# Patient Record
Sex: FEMALE | Race: OTHER | NOT HISPANIC OR LATINO | URBAN - METROPOLITAN AREA
[De-identification: names, ages, dates, MRNs, and addresses within clinical notes are randomized per-mention and may not be internally consistent; named-entity substitution may affect disease eponyms.]

---

## 2024-09-28 ENCOUNTER — OUTPATIENT (OUTPATIENT)
Dept: OUTPATIENT SERVICES | Facility: HOSPITAL | Age: 24
LOS: 1 days | End: 2024-09-28
Payer: COMMERCIAL

## 2024-09-28 VITALS
RESPIRATION RATE: 16 BRPM | HEART RATE: 88 BPM | TEMPERATURE: 98 F | SYSTOLIC BLOOD PRESSURE: 101 MMHG | OXYGEN SATURATION: 99 % | DIASTOLIC BLOOD PRESSURE: 62 MMHG

## 2024-09-28 DIAGNOSIS — O26.899 OTHER SPECIFIED PREGNANCY RELATED CONDITIONS, UNSPECIFIED TRIMESTER: ICD-10-CM

## 2024-09-28 LAB — AMNISURE ROM (RUPTURE OF MEMBRANES): NEGATIVE — SIGNIFICANT CHANGE UP

## 2024-09-28 PROCEDURE — 99212 OFFICE O/P EST SF 10 MIN: CPT | Mod: 25

## 2024-09-28 PROCEDURE — 59025 FETAL NON-STRESS TEST: CPT | Mod: 26

## 2024-09-28 PROCEDURE — 59025 FETAL NON-STRESS TEST: CPT

## 2024-09-28 PROCEDURE — 84112 EVAL AMNIOTIC FLUID PROTEIN: CPT

## 2024-09-28 PROCEDURE — G0463: CPT

## 2024-09-28 RX ORDER — TERCONAZOLE 80 MG/1
1 SUPPOSITORY VAGINAL
Qty: 1 | Refills: 0
Start: 2024-09-28 | End: 2024-09-30

## 2024-09-28 NOTE — OB PROVIDER TRIAGE NOTE - HISTORY OF PRESENT ILLNESS
How Severe Is Your Skin Lesion?: mild Has Your Skin Lesion Been Treated?: not been treated Is This A New Presentation, Or A Follow-Up?: Growth  25 YO  at 36w5d LMP: 1/15/24 MACKENZIE: 10/21/24 presents with complaints of LOF fluid at 6am. Pt states that  fluid was white at that time. Denies any  +FM. Denies HA, vision changes, Chest pain, SOB, epigastric pain, edema, N/V/D, or RUQ pain.    Prenatal care: Sajajd  Complicated by: denies     History  OBHx: Denies   GYNHx: Denies fibroids, cysts, STDs, or abnormal pap smears  PMH: Denies  Meds: PNV, Vit D,A,K   PSHx: Denies  Allergies: NKA  Social: Denies tobacco, EtOH, and drug use  Psych: Denies anxiety, depression, PTSD, or previous suicide attempts. Pt feels safe at home.

## 2024-09-28 NOTE — OB RN TRIAGE NOTE - INTERNATIONAL TRAVEL
Attempted to contact patient for care coordination follow up regarding COPD  Unable to reach patient by phone. Message left regarding purpose of call. Number provided and call back requested. Rita Butler No

## 2024-09-28 NOTE — OB PROVIDER TRIAGE NOTE - ADDITIONAL INSTRUCTIONS
- Follow up with Primary OBGYN as scheduled   - Start terconazole cream for any vaginal itching/ discomfort  - Continue prenatal vitamins   - Check the baby movements with fetal kick counts, return to the hospital if you have decreased fetal movement   - Return to hospital with signs and symptoms of labor: Regular strong contractions, vaginal bleeding, or if your water breaks

## 2024-09-28 NOTE — OB RN TRIAGE NOTE - NS_LMP_OBGYN_ALL_OB_DT
SUBJECTIVE:  Rodrigo Boone is an 43 year old  Presents for:  Chief Complaint   Patient presents with    Office Visit    Pre-Op Exam     Pt is scheduled for hysteroscopy with novasure ablation     History of Chief Complaint: Rodrigo is her for a pre op visit.  She is scheduled for a hysteroscopy with Novasure endometrial ablation for menometrorrhagia on 24 at 09:15.  Her pre op US showed:    FINDINGS: Transabdominal and endovaginal imaging of the pelvis is  performed. The uterus is anteverted and midline in position. Redemonstrated  is a subserosal anterior uterine body fibroid measuring 1.5 x 1.5 x 1.8 cm,  slightly decreased in size since prior study.  There is a diffusely thickened endometrium which appears heterogeneous,  containing multiple small cystic foci.     Right ovary 4.5 x 2.9 x 3.7 cm. Dominant 3.9 x 2.6 x 3.1 cm cyst. Normal  color flow and spectral duplex imaging with waveforms. No torsion.     Left ovary 2.6 x 1.4 x 3.0 cm. Dominant 1.3 cm follicle as well as a 1.8 cm  paraovarian cyst. Normal color flow and spectral duplex imaging with  waveforms. No torsion.     No free pelvic fluid.     UTERUS:  Length 9.6 cm  Height   5.3 cm  Width    6.0 cm  Endometrium      2.0 cm     IMPRESSION:  1. Stable to decreased size subserosal anterior uterine body fibroid.  2. Heterogeneous thickened endometrium. Please correlate with stage of  menstrual cycle. Direct visualization and tissue sampling may be warranted.  3. Dominant right ovarian cyst as seen previously.              Electronically Signed by: PRESTON LIVINGSTON M.D.   Signed on: 3/21/2024 4:44 PM     She had a hysteroscopy with a D&C and the pathology showed:    Pathologic Diagnosis      Endometrium, curettings:  - Fragments of benign endometrial polyp.  - Fragments of secretory endometrium day 26 - .   Electronically signed by Syed Almonte MD on 2024 at 2230     Her tubes have already been tied.    Past Medical History:   Diagnosis  Date    Anemia     Menometrorrhagia        Past Surgical History:   Procedure Laterality Date    D and c  04/23/2024    Tubal ligation         Current Outpatient Medications   Medication Sig Dispense Refill    medroxyPROGESTERone (PROVERA) 10 MG tablet Take 1 tablet by mouth daily. 30 tablet 0    ferrous sulfate 300 (60 Fe) MG/5ML liquid TAKE 5 MLS BY MOUTH IN THE MORNING AND 5 MLS AT NOON AND 5 MLS IN THE EVENING. *FILL WHAT YOU HAVE! 250 mL 1     No current facility-administered medications for this visit.     ALLERGIES:  No Known Allergies    Social History     Tobacco Use    Smoking status: Never    Smokeless tobacco: Never   Substance Use Topics    Alcohol use: Not Currently       Review Of Systems  Review of Systems   Constitutional: Negative.    HENT: Negative.     Eyes: Negative.    Respiratory: Negative.     Cardiovascular: Negative.    Gastrointestinal: Negative.    Endocrine: Negative.    Genitourinary:         As above.   Musculoskeletal: Negative.    Skin: Negative.    Allergic/Immunologic: Negative.    Neurological: Negative.    Hematological: Negative.    Psychiatric/Behavioral: Negative.         OBJECTIVE:  Visit Vitals  /77   Pulse 62   Temp 97.8 °F (36.6 °C) (Temporal)   Resp 18   Ht 5' 4\" (1.626 m)   Wt 87.8 kg (193 lb 7.3 oz)   LMP 06/19/2024 (Exact Date) Comment: has been having irregular bleeding since November 2023   BMI 33.21 kg/m²     Physical Exam  Constitutional:       Appearance: Normal appearance.   Genitourinary:      Vulva, bladder and urethral meatus normal.      Vulva exam comments: Normal..      Vaginal exam comments: Normal..        Right Adnexa: not tender and no mass present.     Left Adnexa: not tender and no mass present.     Cervical exam comments: Normal..      Uterus exam comments: Normal..   Cardiovascular:      Rate and Rhythm: Normal rate and regular rhythm.      Pulses: Normal pulses.      Heart sounds: Normal heart sounds.   Pulmonary:      Effort: Pulmonary  effort is normal.      Breath sounds: Normal breath sounds.   Abdominal:      General: Abdomen is flat. Bowel sounds are normal. There is no distension.      Palpations: Abdomen is soft. There is no mass.      Tenderness: There is no abdominal tenderness.      Hernia: No hernia is present.   Musculoskeletal:         General: Normal range of motion.      Cervical back: Normal range of motion and neck supple.   Neurological:      General: No focal deficit present.      Mental Status: She is alert and oriented to person, place, and time.   Skin:     General: Skin is warm and dry.   Psychiatric:         Mood and Affect: Mood normal.         Behavior: Behavior normal.         Thought Content: Thought content normal.         Judgment: Judgment normal.   Vitals reviewed.         ASSESSMENT/PLAN:  Diagnoses and all orders for this visit:  Pre-op examination  -     CBC with Automated Differential  Menometrorrhagia  -     CBC with Automated Differential  Pre-procedural laboratory examination  -     POCT Urine pregnancy, in-office  -     POCT Urine Dip Auto  -     CBC with Automated Differential    The procedure was explained.  She understands success is 80 to 90% and it might be slightly lower for her due to the fact that she has a fibroid.  Success includes no periods, scant periods or less bleeding then before the procedure.  Risks and complications were reviewed.  Pre op labs were obtained.  Pre op instructions were discussed.  She will follow up in the week after for a post op visit.     15-Emile-2024

## 2024-09-28 NOTE — OB PROVIDER TRIAGE NOTE - NSOBPROVIDERNOTE_OBGYN_ALL_OB_FT
25 yo @ 36w5d presents for r/o rupture, pt stable     - FHT reviewed  - amnisure sent, negative   - Vaginal discharged discussed with patient, Terconazole sent to pharm   - Labor precaution and kick counts discussed   - pt advised to follow up with OB as scheduled     D/W Dr Santiago

## 2024-10-01 DIAGNOSIS — Z3A.36 36 WEEKS GESTATION OF PREGNANCY: ICD-10-CM

## 2024-10-01 DIAGNOSIS — O99.891 OTHER SPECIFIED DISEASES AND CONDITIONS COMPLICATING PREGNANCY: ICD-10-CM

## 2024-10-01 DIAGNOSIS — N89.8 OTHER SPECIFIED NONINFLAMMATORY DISORDERS OF VAGINA: ICD-10-CM

## 2024-10-01 DIAGNOSIS — Z03.71 ENCOUNTER FOR SUSPECTED PROBLEM WITH AMNIOTIC CAVITY AND MEMBRANE RULED OUT: ICD-10-CM

## 2024-10-20 ENCOUNTER — INPATIENT (INPATIENT)
Facility: HOSPITAL | Age: 24
LOS: 2 days | Discharge: HOME | End: 2024-10-23
Attending: OBSTETRICS & GYNECOLOGY | Admitting: OBSTETRICS & GYNECOLOGY
Payer: MEDICAID

## 2024-10-20 VITALS
RESPIRATION RATE: 16 BRPM | DIASTOLIC BLOOD PRESSURE: 76 MMHG | TEMPERATURE: 98 F | SYSTOLIC BLOOD PRESSURE: 112 MMHG | HEART RATE: 63 BPM

## 2024-10-20 DIAGNOSIS — O26.899 OTHER SPECIFIED PREGNANCY RELATED CONDITIONS, UNSPECIFIED TRIMESTER: ICD-10-CM

## 2024-10-20 DIAGNOSIS — Z34.80 ENCOUNTER FOR SUPERVISION OF OTHER NORMAL PREGNANCY, UNSPECIFIED TRIMESTER: ICD-10-CM

## 2024-10-20 LAB
APTT BLD: 28.5 SEC — SIGNIFICANT CHANGE UP (ref 24.5–35.6)
BASOPHILS # BLD AUTO: 0.03 K/UL — SIGNIFICANT CHANGE UP (ref 0–0.2)
BASOPHILS NFR BLD AUTO: 0.3 % — SIGNIFICANT CHANGE UP (ref 0–2)
EOSINOPHIL # BLD AUTO: 0.14 K/UL — SIGNIFICANT CHANGE UP (ref 0–0.5)
EOSINOPHIL NFR BLD AUTO: 1.2 % — SIGNIFICANT CHANGE UP (ref 0–6)
FIBRINOGEN PPP-MCNC: 570 MG/DL — HIGH (ref 200–475)
HCT VFR BLD CALC: 37.2 % — SIGNIFICANT CHANGE UP (ref 34.5–45)
HGB BLD-MCNC: 12.9 G/DL — SIGNIFICANT CHANGE UP (ref 11.5–15.5)
IMM GRANULOCYTES NFR BLD AUTO: 0.4 % — SIGNIFICANT CHANGE UP (ref 0–0.9)
INR BLD: 0.87 RATIO — SIGNIFICANT CHANGE UP (ref 0.85–1.16)
LYMPHOCYTES # BLD AUTO: 2.51 K/UL — SIGNIFICANT CHANGE UP (ref 1–3.3)
LYMPHOCYTES # BLD AUTO: 22.2 % — SIGNIFICANT CHANGE UP (ref 13–44)
MCHC RBC-ENTMCNC: 32.3 PG — SIGNIFICANT CHANGE UP (ref 27–34)
MCHC RBC-ENTMCNC: 34.7 GM/DL — SIGNIFICANT CHANGE UP (ref 32–36)
MCV RBC AUTO: 93 FL — SIGNIFICANT CHANGE UP (ref 80–100)
MONOCYTES # BLD AUTO: 0.79 K/UL — SIGNIFICANT CHANGE UP (ref 0–0.9)
MONOCYTES NFR BLD AUTO: 7 % — SIGNIFICANT CHANGE UP (ref 2–14)
NEUTROPHILS # BLD AUTO: 7.81 K/UL — HIGH (ref 1.8–7.4)
NEUTROPHILS NFR BLD AUTO: 68.9 % — SIGNIFICANT CHANGE UP (ref 43–77)
NRBC # BLD: 0 /100 WBCS — SIGNIFICANT CHANGE UP (ref 0–0)
PLATELET # BLD AUTO: 218 K/UL — SIGNIFICANT CHANGE UP (ref 150–400)
PROTHROM AB SERPL-ACNC: 10.2 SEC — SIGNIFICANT CHANGE UP (ref 9.9–13.4)
RBC # BLD: 4 M/UL — SIGNIFICANT CHANGE UP (ref 3.8–5.2)
RBC # FLD: 13.1 % — SIGNIFICANT CHANGE UP (ref 10.3–14.5)
WBC # BLD: 11.33 K/UL — HIGH (ref 3.8–10.5)
WBC # FLD AUTO: 11.33 K/UL — HIGH (ref 3.8–10.5)

## 2024-10-20 PROCEDURE — 88307 TISSUE EXAM BY PATHOLOGIST: CPT | Mod: 26

## 2024-10-20 DEVICE — SURGICEL FIBRILLAR 2 X 4": Type: IMPLANTABLE DEVICE | Status: FUNCTIONAL

## 2024-10-20 RX ORDER — ACETAMINOPHEN 500 MG
975 TABLET ORAL
Refills: 0 | Status: DISCONTINUED | OUTPATIENT
Start: 2024-10-20 | End: 2024-10-23

## 2024-10-20 RX ORDER — MAGNESIUM HYDROXIDE 1200 MG/15ML
30 SUSPENSION ORAL
Refills: 0 | Status: DISCONTINUED | OUTPATIENT
Start: 2024-10-20 | End: 2024-10-23

## 2024-10-20 RX ORDER — AZITHROMYCIN DIHYDRATE 200 MG/5ML
500 POWDER, FOR SUSPENSION ORAL ONCE
Refills: 0 | Status: COMPLETED | OUTPATIENT
Start: 2024-10-20 | End: 2024-10-20

## 2024-10-20 RX ORDER — OXYTOCIN IN D5W-0.2% SODIUM CL 15/250 ML
10 PLASTIC BAG, INJECTION (ML) INTRAVENOUS ONCE
Refills: 0 | Status: COMPLETED | OUTPATIENT
Start: 2024-10-20 | End: 2024-10-20

## 2024-10-20 RX ORDER — FAMOTIDINE 10 MG/ML
20 INJECTION INTRAVENOUS ONCE
Refills: 0 | Status: DISCONTINUED | OUTPATIENT
Start: 2024-10-20 | End: 2024-10-20

## 2024-10-20 RX ORDER — IBUPROFEN 200 MG
600 TABLET ORAL EVERY 6 HOURS
Refills: 0 | Status: COMPLETED | OUTPATIENT
Start: 2024-10-20 | End: 2025-09-18

## 2024-10-20 RX ORDER — AZITHROMYCIN DIHYDRATE 200 MG/5ML
500 POWDER, FOR SUSPENSION ORAL EVERY 24 HOURS
Refills: 0 | Status: DISCONTINUED | OUTPATIENT
Start: 2024-10-20 | End: 2024-10-20

## 2024-10-20 RX ORDER — OXYCODONE HYDROCHLORIDE 30 MG/1
5 TABLET ORAL
Refills: 0 | Status: COMPLETED | OUTPATIENT
Start: 2024-10-20 | End: 2024-10-27

## 2024-10-20 RX ORDER — MODIFIED LANOLIN
1 OINTMENT (GRAM) TOPICAL EVERY 6 HOURS
Refills: 0 | Status: DISCONTINUED | OUTPATIENT
Start: 2024-10-20 | End: 2024-10-23

## 2024-10-20 RX ORDER — OXYTOCIN IN D5W-0.2% SODIUM CL 15/250 ML
42 PLASTIC BAG, INJECTION (ML) INTRAVENOUS
Qty: 30 | Refills: 0 | Status: DISCONTINUED | OUTPATIENT
Start: 2024-10-20 | End: 2024-10-23

## 2024-10-20 RX ORDER — DEXAMETHASONE 1.5 MG 1.5 MG/1
4 TABLET ORAL EVERY 6 HOURS
Refills: 0 | Status: DISCONTINUED | OUTPATIENT
Start: 2024-10-20 | End: 2024-10-23

## 2024-10-20 RX ORDER — OXYTOCIN IN D5W-0.2% SODIUM CL 15/250 ML
167 PLASTIC BAG, INJECTION (ML) INTRAVENOUS
Qty: 30 | Refills: 0 | Status: DISCONTINUED | OUTPATIENT
Start: 2024-10-20 | End: 2024-10-20

## 2024-10-20 RX ORDER — OXYTOCIN IN D5W-0.2% SODIUM CL 15/250 ML
167 PLASTIC BAG, INJECTION (ML) INTRAVENOUS
Qty: 30 | Refills: 0 | Status: COMPLETED | OUTPATIENT
Start: 2024-10-20

## 2024-10-20 RX ORDER — CITRIC ACID/SODIUM CITRATE 334-500MG
15 SOLUTION, ORAL ORAL EVERY 6 HOURS
Refills: 0 | Status: DISCONTINUED | OUTPATIENT
Start: 2024-10-20 | End: 2024-10-20

## 2024-10-20 RX ORDER — CEFAZOLIN SODIUM 1 G
2000 VIAL (EA) INJECTION ONCE
Refills: 0 | Status: DISCONTINUED | OUTPATIENT
Start: 2024-10-20 | End: 2024-10-20

## 2024-10-20 RX ORDER — HEPARIN SODIUM 10000 [USP'U]/ML
5000 INJECTION INTRAVENOUS; SUBCUTANEOUS EVERY 12 HOURS
Refills: 0 | Status: DISCONTINUED | OUTPATIENT
Start: 2024-10-21 | End: 2024-10-23

## 2024-10-20 RX ORDER — CITRIC ACID/SODIUM CITRATE 334-500MG
30 SOLUTION, ORAL ORAL ONCE
Refills: 0 | Status: DISCONTINUED | OUTPATIENT
Start: 2024-10-20 | End: 2024-10-20

## 2024-10-20 RX ORDER — NALOXONE HYDROCHLORIDE 1 MG/ML
0.1 INJECTION, SOLUTION INTRAMUSCULAR; INTRAVENOUS; SUBCUTANEOUS
Refills: 0 | Status: DISCONTINUED | OUTPATIENT
Start: 2024-10-20 | End: 2024-10-23

## 2024-10-20 RX ORDER — DIPHENHYDRAMINE HCL 12.5MG/5ML
25 ELIXIR ORAL EVERY 6 HOURS
Refills: 0 | Status: DISCONTINUED | OUTPATIENT
Start: 2024-10-20 | End: 2024-10-23

## 2024-10-20 RX ORDER — CLOSTRIDIUM TETANI TOXOID ANTIGEN (FORMALDEHYDE INACTIVATED), CORYNEBACTERIUM DIPHTHERIAE TOXOID ANTIGEN (FORMALDEHYDE INACTIVATED), BORDETELLA PERTUSSIS TOXOID ANTIGEN (GLUTARALDEHYDE INACTIVATED), BORDETELLA PERTUSSIS FILAMENTOUS HEMAGGLUTININ ANTIGEN (FORMALDEHYDE INACTIVATED), BORDETELLA PERTUSSIS PERTACTIN ANTIGEN, AND BORDETELLA PERTUSSIS FIMBRIAE 2/3 ANTIGEN 5; 2; 2.5; 5; 3; 5 [LF]/.5ML; [LF]/.5ML; UG/.5ML; UG/.5ML; UG/.5ML; UG/.5ML
0.5 INJECTION, SUSPENSION INTRAMUSCULAR ONCE
Refills: 0 | Status: DISCONTINUED | OUTPATIENT
Start: 2024-10-20 | End: 2024-10-23

## 2024-10-20 RX ORDER — CHLORHEXIDINE GLUCONATE 40 MG/ML
1 SOLUTION TOPICAL DAILY
Refills: 0 | Status: DISCONTINUED | OUTPATIENT
Start: 2024-10-20 | End: 2024-10-20

## 2024-10-20 RX ORDER — KETOROLAC TROMETHAMINE 30 MG/ML
30 INJECTION INTRAMUSCULAR; INTRAVENOUS EVERY 6 HOURS
Refills: 0 | Status: DISCONTINUED | OUTPATIENT
Start: 2024-10-20 | End: 2024-10-21

## 2024-10-20 RX ORDER — OXYCODONE HYDROCHLORIDE 30 MG/1
5 TABLET ORAL ONCE
Refills: 0 | Status: DISCONTINUED | OUTPATIENT
Start: 2024-10-20 | End: 2024-10-23

## 2024-10-20 RX ORDER — OXYTOCIN IN D5W-0.2% SODIUM CL 15/250 ML
2 PLASTIC BAG, INJECTION (ML) INTRAVENOUS
Qty: 30 | Refills: 0 | Status: DISCONTINUED | OUTPATIENT
Start: 2024-10-20 | End: 2024-10-20

## 2024-10-20 RX ORDER — ONDANSETRON HYDROCHLORIDE 2 MG/ML
4 INJECTION, SOLUTION INTRAMUSCULAR; INTRAVENOUS EVERY 6 HOURS
Refills: 0 | Status: DISCONTINUED | OUTPATIENT
Start: 2024-10-20 | End: 2024-10-23

## 2024-10-20 RX ORDER — CEFAZOLIN SODIUM 1 G
2000 VIAL (EA) INJECTION ONCE
Refills: 0 | Status: COMPLETED | OUTPATIENT
Start: 2024-10-20 | End: 2024-10-20

## 2024-10-20 RX ORDER — AZITHROMYCIN DIHYDRATE 200 MG/5ML
500 POWDER, FOR SUSPENSION ORAL ONCE
Refills: 0 | Status: DISCONTINUED | OUTPATIENT
Start: 2024-10-20 | End: 2024-10-20

## 2024-10-20 RX ORDER — SIMETHICONE 80 MG/1
80 TABLET, CHEWABLE ORAL EVERY 4 HOURS
Refills: 0 | Status: DISCONTINUED | OUTPATIENT
Start: 2024-10-20 | End: 2024-10-23

## 2024-10-20 RX ORDER — MORPHINE SULFATE 30 MG/1
3 TABLET, EXTENDED RELEASE ORAL ONCE
Refills: 0 | Status: DISCONTINUED | OUTPATIENT
Start: 2024-10-20 | End: 2024-10-23

## 2024-10-20 RX ADMIN — Medication 1000 MILLILITER(S): at 20:32

## 2024-10-20 RX ADMIN — Medication 125 MILLILITER(S): at 14:36

## 2024-10-20 RX ADMIN — Medication 2 MILLIUNIT(S)/MIN: at 18:08

## 2024-10-20 RX ADMIN — CHLORHEXIDINE GLUCONATE 1 APPLICATION(S): 40 SOLUTION TOPICAL at 18:07

## 2024-10-20 RX ADMIN — Medication 42 MILLIUNIT(S)/MIN: at 19:29

## 2024-10-20 RX ADMIN — Medication 10 UNIT(S): at 19:29

## 2024-10-20 RX ADMIN — Medication 100 MILLIGRAM(S): at 19:26

## 2024-10-20 RX ADMIN — Medication 125 MILLILITER(S): at 18:04

## 2024-10-20 RX ADMIN — AZITHROMYCIN DIHYDRATE 255 MILLIGRAM(S): 200 POWDER, FOR SUSPENSION ORAL at 19:28

## 2024-10-20 RX ADMIN — Medication 10 UNIT(S): at 19:30

## 2024-10-20 NOTE — OB RN DELIVERY SUMMARY - NS_SEPSISRSKCALC_OBGYN_ALL_OB_FT
EOS calculated successfully. EOS Risk Factor: 0.5/1000 live births ([facility] national incidence); GA=39w6d; Temp=98.8; ROM=0.2; GBS='Negative'; Antibiotics='Broad spectrum antibiotics 2-3.9 hrs prior to birth'

## 2024-10-20 NOTE — OB RN PATIENT PROFILE - BREAST MILK SUPPORTS STABLE NEWBORN BLOOD SUGAR
Problem: Pain  Goal: #Acceptable pain/comfort level is achieved/maintained at rest (based on self report using Numeric Rating Scales/Faces  Outcome: Outcome Not Met, Continue to Monitor  Pt reported pain as a 9 on a scale of 0-10 at 0900 this morning. PRN pain medication given, and 1 hour later pt was sleeping comfortably in room. Will continue to monitor.     Problem: Pressure Injury, Risk for  Goal: No new pressure injury (PI) development  Outcome: Outcome Met, Continue evaluating goal progress toward completion  Pt displays no s/s of new pressure injury. Pt is independent in positioning and switching positions frequently. Will continue to monitor.     Problem: VTE, Risk for  Goal: # No s/s of VTE  Outcome: Outcome Met, Continue evaluating goal progress toward completion  Pt displays no s/s of VTE at this time. Will continue to monitor.     Problem: At Risk for Falls  Goal: # Patient does not fall  Outcome: Outcome Met, Continue evaluating goal progress toward completion  Pt has not fallen so far today. Call light in reach, and uses appropriately when needing assistance. Will continue to monitor.        Statement Selected

## 2024-10-20 NOTE — DISCHARGE NOTE OB - CLICK TO LAUNCH ORM
. Cheek Interpolation Flap Text: A decision was made to reconstruct the defect utilizing an interpolation axial flap and a staged reconstruction.  A telfa template was made of the defect.  This telfa template was then used to outline the Cheek Interpolation flap.  The donor area for the pedicle flap was then injected with anesthesia.  The flap was excised through the skin and subcutaneous tissue down to the layer of the underlying musculature.  The interpolation flap was carefully excised within this deep plane to maintain its blood supply.  The edges of the donor site were undermined.   The donor site was closed in a primary fashion.  The pedicle was then rotated into position and sutured.  Once the tube was sutured into place, adequate blood supply was confirmed with blanching and refill.  The pedicle was then wrapped with xeroform gauze and dressed appropriately with a telfa and gauze bandage to ensure continued blood supply and protect the attached pedicle.

## 2024-10-20 NOTE — OB PROVIDER H&P - NSHPPHYSICALEXAM_GEN_ALL_CORE
Vital Signs Last 24 Hrs  T(C): 37.1 (20 Oct 2024 12:02), Max: 37.1 (20 Oct 2024 12:02)  T(F): 98.7 (20 Oct 2024 12:02), Max: 98.7 (20 Oct 2024 12:02)  HR: 70 (20 Oct 2024 12:02) (70 - 70)  BP: 111/76 (20 Oct 2024 12:02) (111/76 - 111/76)  RR: 18 (20 Oct 2024 12:02) (18 - 18)  SpO2: 99% (20 Oct 2024 12:02) (99% - 99%)    Patient seen with EVANGELISTA Damon.     Physical Exam:   General: A & O, in NAD  Abdomen: gravid uterus, soft, nontender  SVE: 1-2/70/-3/intact    FHT: Baseline 145, moderate variability, + Acels, no decels   [place]: Contractions every 4-5 minutes    Bedside Sono: Cephalic Vital Signs Last 24 Hrs  T(C): 37.1 (20 Oct 2024 12:02), Max: 37.1 (20 Oct 2024 12:02)  T(F): 98.7 (20 Oct 2024 12:02), Max: 98.7 (20 Oct 2024 12:02)  HR: 70 (20 Oct 2024 12:02) (70 - 70)  BP: 111/76 (20 Oct 2024 12:02) (111/76 - 111/76)  RR: 18 (20 Oct 2024 12:02) (18 - 18)  SpO2: 99% (20 Oct 2024 12:02) (99% - 99%)    Patient seen with EVANGELISTA Damon.     Physical Exam:   General: A & O, in NAD  Abdomen: gravid uterus, soft, nontender  SVE: 1-2/70/-3/intact    FHT: 145, moderate variability, + Accels, no decels   [place]: Contractions every 4-5 minutes    Bedside Sono: Cephalic

## 2024-10-20 NOTE — DISCHARGE NOTE OB - PATIENT PORTAL LINK FT
You can access the FollowMyHealth Patient Portal offered by [facility] by registering at the following website: [email or web address]. By joining "[employer]’s FollowMyHealth portal, you will also be able to view your health information using other applications (apps) compatible with our system.

## 2024-10-20 NOTE — OB RN TRIAGE NOTE - FALL HARM RISK - UNIVERSAL INTERVENTIONS
Bed in lowest position, wheels locked, appropriate side rails in place/Call bell, personal items and telephone in reach/Instruct patient to call for assistance before getting out of bed or chair/Non-slip footwear when patient is out of bed/[place] to call system/Physically safe environment - no spills, clutter or unnecessary equipment/Purposeful Proactive Rounding/Room/bathroom lighting operational, light cord in reach

## 2024-10-20 NOTE — OB RN INTRAOPERATIVE NOTE - NSSELHIDDEN_OBGYN_ALL_OB_FT
[[email or web address]_ALL_OB_FT:[email or web address]],[NS_DeliveryAssist1_OBGYN_ALL_OB_FT:[id number]]

## 2024-10-20 NOTE — DISCHARGE NOTE OB - HOSPITAL COURSE
Pt admitted for early labor .   IN labor pt had a prolonged decel resulting in a stat  section  pt had routine post-op care.

## 2024-10-20 NOTE — DISCHARGE NOTE OB - MEDICATION SUMMARY - MEDICATIONS TO TAKE
I will START or STAY ON the medications listed below when I get home from the hospital:    ibuprofen 600 mg oral tablet  -- 1 tab(s) by mouth every 6 hours  -- Indication: For for pain    Tylenol Extra Strength 500 mg oral tablet  -- 2 tab(s) by mouth every 6 hours  -- Indication: For for pain    Prenatal Plus oral tablet  -- 1 tab(s) by mouth once a day  -- Indication: For breastdeeding    ferrous sulfate 325 mg (65 mg elemental iron) oral tablet  -- 1 tab(s) by mouth once a day  -- Indication: For anemia    Colace 2-in-1 50 mg-8.6 mg oral tablet  -- 2 tab(s) by mouth once a day  -- Indication: For bowel regime

## 2024-10-20 NOTE — DISCHARGE NOTE OB - FINANCIAL ASSISTANCE
[facility] provides services at a reduced cost to those who are determined to be eligible through [facility]’s financial assistance program. Information regarding [facility]’s financial assistance program can be found by going to [email or web address] or by calling [phone].

## 2024-10-20 NOTE — DISCHARGE NOTE OB - PLAN OF CARE
no sex nothing in vagina no heavy lifting no pushing no straining no strenuous activities  pain medication as needed; stool softener; dulcolax as needed if constipated  walk for exercise: helps recovery   continue prenatal vitamins daily especially whole course of breastfeeding  see your OB in the office for follow up post partum check call the office set up appointment in 1-2weeks  clean wound daily with soap and water; please note wound for redness or swelling; if noted go to the office right away so the doctor can evaluate the wound for possible wound infection  Take ibuprofen 600mg one tablet every 6hours, alternate with Tylenol 500mg one- two tabs every 6 hours as needed for pain. Take iron and prenatal vitamins. Eat iron fortified foods.

## 2024-10-20 NOTE — DISCHARGE NOTE OB - CARE PROVIDER_API CALL
Jennifer Reyna  Obstetrics and Gynecology  [address] [place] [address]  Phone: [phone]  Fax: [phone]  Established Patient  Follow Up Time: 1 month

## 2024-10-20 NOTE — OB RN DELIVERY SUMMARY - NS_VACUUMATTEMPT_OBGYN_ALL_OB
Spoke with patient and informed of Dr Noonan's response. Patient states that she stopped bleeding over the weekend but started again yesterday. She took a tablet of Provera and shortly thereafter developed a rash on her face. She mentions that she had chills and a fever over the weekend also. Patient spoke with Shyanne MOLINA yesterday and was instructed to take benadryl. Patient states that she has not the benadryl and rash remains. Suggested that she go to an urgent care or contact her PCP for eval of rash. Explained that it could be viral since she was also experiencing chills and fever over the weekend and not related to the Provera. Instructed to monitor and go to the ED if she experiencing difficulty breathing and/or trouble swallowing. Patient states understanding.   Vacuum Extraction was not used

## 2024-10-20 NOTE — OB RN DELIVERY SUMMARY - DELIVERY COMPLICATIONS; NUCHAL CORD
INR 0 91 - was on 10 mg of warfarin daily   Last INR 1 48 ( 11/29/22)    Patient missed few weeks of coumadin x1

## 2024-10-20 NOTE — CHART NOTE - NSCHARTNOTEFT_GEN_A_CORE
PA POST OP NOTE    Patient seen at bedside, resting comfortably  Offers no new complaints.  Due to ambulate, aviles to be removed and due to void, tolerating clear diet at this time.  Denies HA, CP, SOB, N/V/D, dizziness, palpitations, worsening abdominal pain, worsening vaginal bleeding.    Vital Signs Last 24 Hrs  T(C): 36.6 (20 Oct 2024 22:00), Max: 37.1 (20 Oct 2024 12:02)  T(F): 97.9 (20 Oct 2024 22:00), Max: 98.8 (20 Oct 2024 17:13)  HR: 89 (20 Oct 2024 22:30) (63 - 108)  BP: 100/53 (20 Oct 2024 22:30) (76/38 - 112/76)  BP(mean): 67 (20 Oct 2024 22:30) (49 - 78)  RR: 19 (20 Oct 2024 22:30) (14 - 22)  SpO2: 98% (20 Oct 2024 22:30) (95% - 99%)    Parameters below as of 20 Oct 2024 17:13  Patient On (Oxygen Delivery Method): room air      Gen: A&O x 3, NAD  Chest: CTA B/L  Cardiac: S1, S2  RRR  Breast: Soft, nontender, nonengorged  Abdomen: +BS; soft; NT; ND; fundus firm below umbilicus, Dressing C/D/I  Gyn: Minimal lochia  Ext: Nontender, DTRS 2+, no worsening edema, venodynes intact                          12.9   11.33 )-----------( 218      ( 20 Oct 2024 13:40 )             37.2       A/P: 25yo  pt s/p prim c/s at 39w6d for NRFHT remote from delivery  -Pain management as needed  -OOB and ambulate  -f/u Rpt CBC in am  -DC aviles f/u void  -Advance diet with flatus  -Encourage breastfeeding  -d/w Dr. Rollins

## 2024-10-20 NOTE — OB PROVIDER DELIVERY SUMMARY - NSSELHIDDEN_OBGYN_ALL_OB_FT
[NS_DeliveryAttending1_OBGYN_ALL_OB_FT:[email or web address]=],[NS_DeliveryAssist1_OBGYN_ALL_OB_FT:[id number]]

## 2024-10-20 NOTE — OB RN PATIENT PROFILE - NS_PRENATALLABSOURCEHEPATITISC_OBGYN_ALL_OB
hard copy, drawn during this pregnancy Purse String (Intermediate) Text: Given the location of the defect and the characteristics of the surrounding skin a purse string intermediate closure was deemed most appropriate.  Undermining was performed circumfirentially around the surgical defect.  A purse string suture was then placed and tightened.

## 2024-10-20 NOTE — OB RN DELIVERY SUMMARY - NSSELHIDDEN_OBGYN_ALL_OB_FT
[NS_DeliveryAttending1_OBGYN_ALL_OB_FT:[id number]=],[NS_DeliveryAssist1_OBGYN_ALL_OB_FT:[id number]]

## 2024-10-20 NOTE — DISCHARGE NOTE OB - YOU MAY BE A PASSENGER IN A CAR.  BE SURE TO GET OUT OF THE CAR AND STRETCH IF YOU TRAVEL FOR A LONG PERIOD OF TIME
[3 or 4 (1 pt)] : 3 or 4  (1 point) [Less than monthly (1 pt)] : Less than monthly (1 point) [Yes] : In the past 12 months have you used drugs other than those required for medical reasons? Yes [No falls in past year] : Patient reported no falls in the past year [0] : 2) Feeling down, depressed, or hopeless: Not at all (0) [] : No [de-identified] : chew tobacco  [Audit-CScore] : 4 [de-identified] : marijuana  [de-identified] : walk gets 07703 steps a day  surfs  [de-identified] : whole 30 diet at times  [VSB8Aasyp] : 0 Statement Selected

## 2024-10-20 NOTE — OB PROVIDER H&P - HISTORY OF PRESENT ILLNESS
25 y/o  47oap5h (LMP: 01/15/2024 MACKENZIE: 10/21/2024) presents to triage with contractions every 5 minutes starting this morning.  Patient has + FM, denies loss of fluid, denies vaginal bleeding.  Patient denies headache, vision changes, chest pain, SOB, N/V/D.     PNC: Sajjad, Anemia in pregnancy, IUGR EFW 6th percentile.   OBHx: Primigravid  GYNHx: PCOS, no meds  PMHx: PCOS  Allergies: none  Meds: PNV, Vit A, D, K B 25 y/o  at [id number] (LMP: 01/15/2024 MACKENZIE: 10/21/2024) presents to triage with contractions every 5 minutes starting this morning.  Patient has + FM, denies loss of fluid, denies vaginal bleeding.  Patient denies headache, vision changes, chest pain, SOB, N/V/D.     PNC: Sajjad  1) Anemia in pregnancy  2) IUGR (10/1)  EFW 2466g (6th percentile)   OBHx: Primigravid  GYNHx: PCOS, no meds  PMHx: denies  PSHx: denies  Allergies: none  Meds: PNV, Vit A, D, K B

## 2024-10-20 NOTE — OB PROVIDER LABOR PROGRESS NOTE - ASSESSMENT
23 yo  at 39.6 in early labor with FGR for augmentation, GBS neg, cat 2 FHT  - cont current monitoring  - start pitocin if no additional decels  - d/w Dr. Martines
23 yo  at 39.6 in early labor with FGR for augmentation, GBS neg, cat 2 FHT  - cont current monitoring  - SROM bloody fluid  - pit off  - ISE placed and FHT confirmed  - pt repositioned  - decision made to proceed with STAT c/s for NRFHT remote from delivery  - Dr. Rollins at bedside

## 2024-10-20 NOTE — DISCHARGE NOTE OB - MEDICATION SUMMARY - MEDICATIONS TO STOP TAKING
I will STOP taking the medications listed below when I get home from the hospital:    terconazole 0.8% vaginal cream  -- 1 applicatorful intravaginally once a day (at bedtime)

## 2024-10-20 NOTE — DISCHARGE NOTE OB - TEMPERATURE GREATER THAN 100.0  F ORALLY
CC: Spots of concern on face    HPI: Aida Bee is a 64 year old female who presents as a new patient for a spot of concern on her right temple and left cheek. The area on the right temple has bled before with scratching. No itching or pain associated with the other areas. She has used ?hydroquinone previously on her skin   She also has an enlarging dark line on her right 2nd finger, believes this has gotten wider.      ALLERGIES:   Allergen Reactions   • Sulfa Antibiotics HIVES       FH: no personal or family history of skin cancer    ROS:   Feeling well, no other skin complaints    PE:  Well-appearing, pleasant female in no acute distress. AAO X 3.  Cain skin type I.  Skin exam included the scalp, face, neck, chest, abdomen, back, arms, legs, including nails and buttocks:  -tan macules scattered on sun exposed face  brown cobbled papule(s) on the right temple, left mandibular angle  -linear hyperpigmented macule on the right 2nd digit, no concerning dermoscopic features    Assessment/Plan:  1. Solar lentigines   -Recommended [facility] in [place] for cosmetic treatment.     2. Seborrheic keratoses - Discussed the benign nature of these lesions.  No treatment is necessary unless it is inflamed, painful or itchy.      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  This is solitary on one fingernail but had no concerning dermoscopic features, no hutchingsons sign.  We went over what a nail matrix biopsy would entail and she declined. 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           
Statement Selected

## 2024-10-20 NOTE — OB PROVIDER DELIVERY SUMMARY - NSPROVIDERDELIVERYNOTE_OBGYN_ALL_OB_FT
Delivered via lst CS liveborn male campbell apgar 9/9 . Nuchal cord x 1 reduced.  Delayed cord clamping performed. Cord for gas and cord blood obtained.  Placenta spontaneously  and delivered intact.  Uterus firm after massage and pitocin administered.  Hysterotomy closed with monocryl.  Rectii, fascia, subcutaneous layer and skin reapproximated in usual fashion.  Steristrips applied.  Mother extubated in OR and transferred to recovery area in stable condition.  Baby stable.  ebl-900  urine-200  ivf-1500 Delivered via lst CS liveborn male campbell apgar 9/9 . Nuchal cord x 1 reduced.  Delayed cord clamping performed. Cord for gas and cord blood obtained.  Placenta spontaneously  and delivered intact.  Uterus firm after massage and pitocin administered.  Hysterotomy closed with monocryl.  Rectii, fascia, subcutaneous layer and skin reapproximated in usual fashion.  Steristrips applied.  Mother extubated in OR and transferred to recovery area in stable condition.  Baby stable.  ebl-900  urine-200  ivf-1500  job [id number]

## 2024-10-20 NOTE — DISCHARGE NOTE OB - CARE PLAN
1 Principal Discharge DX:	Status post primary low transverse  section  Assessment and plan of treatment:	no sex nothing in vagina no heavy lifting no pushing no straining no strenuous activities  pain medication as needed; stool softener; dulcolax as needed if constipated  walk for exercise: helps recovery   continue prenatal vitamins daily especially whole course of breastfeeding  see your OB in the office for follow up post partum check call the office set up appointment in 1-2weeks  clean wound daily with soap and water; please note wound for redness or swelling; if noted go to the office right away so the doctor can evaluate the wound for possible wound infection  Take ibuprofen 600mg one tablet every 6hours, alternate with Tylenol 500mg one- two tabs every 6 hours as needed for pain.   Principal Discharge DX:	Status post primary low transverse  section  Assessment and plan of treatment:	no sex nothing in vagina no heavy lifting no pushing no straining no strenuous activities  pain medication as needed; stool softener; dulcolax as needed if constipated  walk for exercise: helps recovery   continue prenatal vitamins daily especially whole course of breastfeeding  see your OB in the office for follow up post partum check call the office set up appointment in 1-2weeks  clean wound daily with soap and water; please note wound for redness or swelling; if noted go to the office right away so the doctor can evaluate the wound for possible wound infection  Take ibuprofen 600mg one tablet every 6hours, alternate with Tylenol 500mg one- two tabs every 6 hours as needed for pain.  Secondary Diagnosis:	Postoperative anemia due to acute blood loss  Assessment and plan of treatment:	Take iron and prenatal vitamins. Eat iron fortified foods.

## 2024-10-20 NOTE — OB PROVIDER H&P - ASSESSMENT
25 y/o  [id number] (LMP: 01/15/2024 MACKENZIE: 10/21/2024) admitted for early labor, IUGR.     - Admit to L & D for early labor  - Close fetal and maternal monitoring  - Pain management  - Routine admission labs   25 y/o  at [id number] (LMP: 01/15/2024 MACKENZIE: 10/21/2024) with FGR admitted for early labor requesting pain control, GBS neg, cat 1 FHT    - Admit to L & D for early labor  - Close fetal and maternal monitoring  - NPO/IVF  - Pain management per epidural  - Routine admission labs  - consider augmentation after epidural if needed  - d/w Dr. Martines  - consent to be signed by Dr. Martines

## 2024-10-20 NOTE — OB RN DELIVERY SUMMARY - AS DELIV COMPLICATIONS OB
abnormal fetal heart rate tracing/nuchal cord x1 loose/abnormal fetal heart rate tracing/nuchal cord

## 2024-10-20 NOTE — DISCHARGE NOTE OB - NS MD DC FALL RISK RISK
For information on Fall & Injury Prevention, visit: [email or web address] OR  [email or web address] OR  https://www.cdc.gov/steadi/patient.html

## 2024-10-21 DIAGNOSIS — D62 ACUTE POSTHEMORRHAGIC ANEMIA: ICD-10-CM

## 2024-10-21 LAB
BASOPHILS # BLD AUTO: 0.02 K/UL — SIGNIFICANT CHANGE UP (ref 0–0.2)
BASOPHILS NFR BLD AUTO: 0.2 % — SIGNIFICANT CHANGE UP (ref 0–2)
EOSINOPHIL # BLD AUTO: 0.06 K/UL — SIGNIFICANT CHANGE UP (ref 0–0.5)
EOSINOPHIL NFR BLD AUTO: 0.5 % — SIGNIFICANT CHANGE UP (ref 0–6)
HCT VFR BLD CALC: 25 % — LOW (ref 34.5–45)
HGB BLD-MCNC: 8.7 G/DL — LOW (ref 11.5–15.5)
IMM GRANULOCYTES NFR BLD AUTO: 0.5 % — SIGNIFICANT CHANGE UP (ref 0–0.9)
LYMPHOCYTES # BLD AUTO: 19.6 % — SIGNIFICANT CHANGE UP (ref 13–44)
LYMPHOCYTES # BLD AUTO: 2.16 K/UL — SIGNIFICANT CHANGE UP (ref 1–3.3)
MCHC RBC-ENTMCNC: 32.6 PG — SIGNIFICANT CHANGE UP (ref 27–34)
MCHC RBC-ENTMCNC: 34.8 GM/DL — SIGNIFICANT CHANGE UP (ref 32–36)
MCV RBC AUTO: 93.6 FL — SIGNIFICANT CHANGE UP (ref 80–100)
MONOCYTES # BLD AUTO: 0.96 K/UL — HIGH (ref 0–0.9)
MONOCYTES NFR BLD AUTO: 8.7 % — SIGNIFICANT CHANGE UP (ref 2–14)
NEUTROPHILS # BLD AUTO: 7.75 K/UL — HIGH (ref 1.8–7.4)
NEUTROPHILS NFR BLD AUTO: 70.5 % — SIGNIFICANT CHANGE UP (ref 43–77)
NRBC # BLD: 0 /100 WBCS — SIGNIFICANT CHANGE UP (ref 0–0)
PLATELET # BLD AUTO: 138 K/UL — LOW (ref 150–400)
RBC # BLD: 2.67 M/UL — LOW (ref 3.8–5.2)
RBC # FLD: 13.5 % — SIGNIFICANT CHANGE UP (ref 10.3–14.5)
RUBV IGG SER-ACNC: 1.95 INDEX — SIGNIFICANT CHANGE UP
RUBV IGG SER-IMP: POSITIVE — SIGNIFICANT CHANGE UP
T PALLIDUM AB TITR SER: NEGATIVE — SIGNIFICANT CHANGE UP
WBC # BLD: 11 K/UL — HIGH (ref 3.8–10.5)
WBC # FLD AUTO: 11 K/UL — HIGH (ref 3.8–10.5)

## 2024-10-21 RX ORDER — FAMOTIDINE 10 MG/ML
20 INJECTION INTRAVENOUS
Refills: 0 | Status: DISCONTINUED | OUTPATIENT
Start: 2024-10-21 | End: 2024-10-23

## 2024-10-21 RX ORDER — IBUPROFEN 200 MG
600 TABLET ORAL EVERY 6 HOURS
Refills: 0 | Status: DISCONTINUED | OUTPATIENT
Start: 2024-10-21 | End: 2024-10-23

## 2024-10-21 RX ADMIN — KETOROLAC TROMETHAMINE 30 MILLIGRAM(S): 30 INJECTION INTRAMUSCULAR; INTRAVENOUS at 18:20

## 2024-10-21 RX ADMIN — FAMOTIDINE 20 MILLIGRAM(S): 10 INJECTION INTRAVENOUS at 18:19

## 2024-10-21 RX ADMIN — Medication 975 MILLIGRAM(S): at 09:00

## 2024-10-21 RX ADMIN — KETOROLAC TROMETHAMINE 30 MILLIGRAM(S): 30 INJECTION INTRAMUSCULAR; INTRAVENOUS at 01:22

## 2024-10-21 RX ADMIN — Medication 975 MILLIGRAM(S): at 03:38

## 2024-10-21 RX ADMIN — HEPARIN SODIUM 5000 UNIT(S): 10000 INJECTION INTRAVENOUS; SUBCUTANEOUS at 21:10

## 2024-10-21 RX ADMIN — KETOROLAC TROMETHAMINE 30 MILLIGRAM(S): 30 INJECTION INTRAMUSCULAR; INTRAVENOUS at 00:22

## 2024-10-21 RX ADMIN — Medication 975 MILLIGRAM(S): at 15:30

## 2024-10-21 RX ADMIN — Medication 975 MILLIGRAM(S): at 04:38

## 2024-10-21 RX ADMIN — SIMETHICONE 80 MILLIGRAM(S): 80 TABLET, CHEWABLE ORAL at 06:42

## 2024-10-21 RX ADMIN — HEPARIN SODIUM 5000 UNIT(S): 10000 INJECTION INTRAVENOUS; SUBCUTANEOUS at 09:55

## 2024-10-21 RX ADMIN — Medication 975 MILLIGRAM(S): at 16:30

## 2024-10-21 RX ADMIN — KETOROLAC TROMETHAMINE 30 MILLIGRAM(S): 30 INJECTION INTRAMUSCULAR; INTRAVENOUS at 07:40

## 2024-10-21 RX ADMIN — Medication 975 MILLIGRAM(S): at 21:11

## 2024-10-21 RX ADMIN — KETOROLAC TROMETHAMINE 30 MILLIGRAM(S): 30 INJECTION INTRAMUSCULAR; INTRAVENOUS at 13:28

## 2024-10-21 RX ADMIN — Medication 975 MILLIGRAM(S): at 22:11

## 2024-10-21 RX ADMIN — KETOROLAC TROMETHAMINE 30 MILLIGRAM(S): 30 INJECTION INTRAMUSCULAR; INTRAVENOUS at 18:35

## 2024-10-21 RX ADMIN — SIMETHICONE 80 MILLIGRAM(S): 80 TABLET, CHEWABLE ORAL at 12:27

## 2024-10-21 RX ADMIN — KETOROLAC TROMETHAMINE 30 MILLIGRAM(S): 30 INJECTION INTRAMUSCULAR; INTRAVENOUS at 12:28

## 2024-10-21 RX ADMIN — KETOROLAC TROMETHAMINE 30 MILLIGRAM(S): 30 INJECTION INTRAMUSCULAR; INTRAVENOUS at 06:41

## 2024-10-21 RX ADMIN — Medication 975 MILLIGRAM(S): at 16:59

## 2024-10-22 LAB
BASOPHILS # BLD AUTO: 0.01 K/UL — SIGNIFICANT CHANGE UP (ref 0–0.2)
BASOPHILS NFR BLD AUTO: 0.1 % — SIGNIFICANT CHANGE UP (ref 0–2)
EOSINOPHIL # BLD AUTO: 0.12 K/UL — SIGNIFICANT CHANGE UP (ref 0–0.5)
EOSINOPHIL NFR BLD AUTO: 0.9 % — SIGNIFICANT CHANGE UP (ref 0–6)
HCT VFR BLD CALC: 25.4 % — LOW (ref 34.5–45)
HGB BLD-MCNC: 8.9 G/DL — LOW (ref 11.5–15.5)
IMM GRANULOCYTES NFR BLD AUTO: 0.6 % — SIGNIFICANT CHANGE UP (ref 0–0.9)
LYMPHOCYTES # BLD AUTO: 14.8 % — SIGNIFICANT CHANGE UP (ref 13–44)
LYMPHOCYTES # BLD AUTO: 2.03 K/UL — SIGNIFICANT CHANGE UP (ref 1–3.3)
MCHC RBC-ENTMCNC: 33 PG — SIGNIFICANT CHANGE UP (ref 27–34)
MCHC RBC-ENTMCNC: 35 GM/DL — SIGNIFICANT CHANGE UP (ref 32–36)
MCV RBC AUTO: 94.1 FL — SIGNIFICANT CHANGE UP (ref 80–100)
MONOCYTES # BLD AUTO: 1.13 K/UL — HIGH (ref 0–0.9)
MONOCYTES NFR BLD AUTO: 8.2 % — SIGNIFICANT CHANGE UP (ref 2–14)
NEUTROPHILS # BLD AUTO: 10.34 K/UL — HIGH (ref 1.8–7.4)
NEUTROPHILS NFR BLD AUTO: 75.4 % — SIGNIFICANT CHANGE UP (ref 43–77)
NRBC # BLD: 0 /100 WBCS — SIGNIFICANT CHANGE UP (ref 0–0)
PLATELET # BLD AUTO: 166 K/UL — SIGNIFICANT CHANGE UP (ref 150–400)
RBC # BLD: 2.7 M/UL — LOW (ref 3.8–5.2)
RBC # FLD: 13.8 % — SIGNIFICANT CHANGE UP (ref 10.3–14.5)
WBC # BLD: 13.71 K/UL — HIGH (ref 3.8–10.5)
WBC # FLD AUTO: 13.71 K/UL — HIGH (ref 3.8–10.5)

## 2024-10-22 RX ORDER — OXYCODONE HYDROCHLORIDE 30 MG/1
5 TABLET ORAL
Refills: 0 | Status: DISCONTINUED | OUTPATIENT
Start: 2024-10-22 | End: 2024-10-23

## 2024-10-22 RX ADMIN — Medication 975 MILLIGRAM(S): at 02:04

## 2024-10-22 RX ADMIN — Medication 600 MILLIGRAM(S): at 13:00

## 2024-10-22 RX ADMIN — MAGNESIUM HYDROXIDE 30 MILLILITER(S): 1200 SUSPENSION ORAL at 12:58

## 2024-10-22 RX ADMIN — SIMETHICONE 80 MILLIGRAM(S): 80 TABLET, CHEWABLE ORAL at 09:47

## 2024-10-22 RX ADMIN — FAMOTIDINE 20 MILLIGRAM(S): 10 INJECTION INTRAVENOUS at 17:23

## 2024-10-22 RX ADMIN — Medication 975 MILLIGRAM(S): at 09:00

## 2024-10-22 RX ADMIN — SIMETHICONE 80 MILLIGRAM(S): 80 TABLET, CHEWABLE ORAL at 22:24

## 2024-10-22 RX ADMIN — Medication 975 MILLIGRAM(S): at 10:00

## 2024-10-22 RX ADMIN — Medication 600 MILLIGRAM(S): at 18:20

## 2024-10-22 RX ADMIN — Medication 600 MILLIGRAM(S): at 12:00

## 2024-10-22 RX ADMIN — Medication 975 MILLIGRAM(S): at 16:00

## 2024-10-22 RX ADMIN — HEPARIN SODIUM 5000 UNIT(S): 10000 INJECTION INTRAVENOUS; SUBCUTANEOUS at 09:48

## 2024-10-22 RX ADMIN — Medication 975 MILLIGRAM(S): at 17:00

## 2024-10-22 RX ADMIN — Medication 600 MILLIGRAM(S): at 05:52

## 2024-10-22 RX ADMIN — Medication 975 MILLIGRAM(S): at 22:20

## 2024-10-22 RX ADMIN — Medication 600 MILLIGRAM(S): at 23:51

## 2024-10-22 RX ADMIN — Medication 975 MILLIGRAM(S): at 23:20

## 2024-10-22 RX ADMIN — FAMOTIDINE 20 MILLIGRAM(S): 10 INJECTION INTRAVENOUS at 05:53

## 2024-10-22 RX ADMIN — Medication 975 MILLIGRAM(S): at 03:04

## 2024-10-22 RX ADMIN — Medication 600 MILLIGRAM(S): at 01:12

## 2024-10-22 RX ADMIN — Medication 600 MILLIGRAM(S): at 17:20

## 2024-10-22 RX ADMIN — Medication 600 MILLIGRAM(S): at 00:12

## 2024-10-22 RX ADMIN — HEPARIN SODIUM 5000 UNIT(S): 10000 INJECTION INTRAVENOUS; SUBCUTANEOUS at 22:24

## 2024-10-22 RX ADMIN — Medication 600 MILLIGRAM(S): at 07:00

## 2024-10-22 NOTE — LACTATION INITIAL EVALUATION - LACTATION INTERVENTIONS
Breastfeeding on cue 8-12X/24 hours with diaper count to assess for adequate intake, safe skin to skin and rooming-in encouraged.  Mom requests formula. Explored reason and discussed risks of supplementing while breastfeeding without medical reason. Mom still chooses to supplement with formula. Explained risks of using artificial nipples and discussed options for using alternative feeding methods instead of nipple.  Safe formula preparation and feeding handout provided and discussed./initiate/review safe skin-to-skin/initiate/review hand expression/initiate/review techniques for position and latch/review techniques to increase milk supply/review techniques to manage sore nipples/engorgement/initiate/review breast massage/compression/reviewed components of an effective feeding and at least 8 effective feedings per day required/reviewed importance of monitoring infant diapers, the breastfeeding log, and minimum output each day/reviewed benefits and recommendations for rooming in/reviewed feeding on demand/by cue at least 8 times a day/reviewed indications of inadequate milk transfer that would require supplementation
Attended breastfeeding, postpartum and  care class today. Patientst's questions/concerns regarding breastfeeding, general self and 's care addressed. Educated on Shaken Baby Syndrome and Safe Sleep Practices./initiate/review safe skin-to-skin/initiate/review hand expression/initiate/review pumping guidelines and safe milk handling/initiate/review techniques for position and latch/post discharge community resources provided/review techniques to increase milk supply/review techniques to manage sore nipples/engorgement/initiate/review breast massage/compression/reviewed components of an effective feeding and at least 8 effective feedings per day required/reviewed importance of monitoring infant diapers, the breastfeeding log, and minimum output each day/reviewed benefits and recommendations for rooming in/reviewed feeding on demand/by cue at least 8 times a day/recommended follow-up with pediatrician within 24 hours of discharge/reviewed indications of inadequate milk transfer that would require supplementation

## 2024-10-22 NOTE — LACTATION INITIAL EVALUATION - AS DELIV COMPLICATIONS OB
x1 loose - see delivery summary/abnormal fetal heart rate tracing/nuchal cord
x1 loose - see delivery summary/abnormal fetal heart rate tracing/nuchal cord

## 2024-10-23 VITALS
SYSTOLIC BLOOD PRESSURE: 100 MMHG | OXYGEN SATURATION: 98 % | DIASTOLIC BLOOD PRESSURE: 64 MMHG | RESPIRATION RATE: 18 BRPM | TEMPERATURE: 98 F | HEART RATE: 80 BPM

## 2024-10-23 PROCEDURE — 86780 TREPONEMA PALLIDUM: CPT

## 2024-10-23 PROCEDURE — 59050 FETAL MONITOR W/REPORT: CPT

## 2024-10-23 PROCEDURE — 86901 BLOOD TYPING SEROLOGIC RH(D): CPT

## 2024-10-23 PROCEDURE — 85384 FIBRINOGEN ACTIVITY: CPT

## 2024-10-23 PROCEDURE — 85730 THROMBOPLASTIN TIME PARTIAL: CPT

## 2024-10-23 PROCEDURE — 86900 BLOOD TYPING SEROLOGIC ABO: CPT

## 2024-10-23 PROCEDURE — 88307 TISSUE EXAM BY PATHOLOGIST: CPT

## 2024-10-23 PROCEDURE — 86923 COMPATIBILITY TEST ELECTRIC: CPT

## 2024-10-23 PROCEDURE — 85025 COMPLETE CBC W/AUTO DIFF WBC: CPT

## 2024-10-23 PROCEDURE — 90656 IIV3 VACC NO PRSV 0.5 ML IM: CPT

## 2024-10-23 PROCEDURE — C1889: CPT

## 2024-10-23 PROCEDURE — 86850 RBC ANTIBODY SCREEN: CPT

## 2024-10-23 PROCEDURE — 36415 COLL VENOUS BLD VENIPUNCTURE: CPT

## 2024-10-23 PROCEDURE — 85610 PROTHROMBIN TIME: CPT

## 2024-10-23 PROCEDURE — 86762 RUBELLA ANTIBODY: CPT

## 2024-10-23 RX ORDER — FERROUS SULFATE 325(65) MG
1 TABLET ORAL
Qty: 30 | Refills: 0
Start: 2024-10-23 | End: 2024-11-21

## 2024-10-23 RX ORDER — PRENATAL VIT/IRON FUM/FOLIC AC 60 MG-1 MG
1 TABLET ORAL
Qty: 30 | Refills: 5
Start: 2024-10-23 | End: 2025-04-20

## 2024-10-23 RX ORDER — SENNOSIDES AND DOCUSATE SODIUM 8.6; 5 MG/1; MG/1
2 TABLET ORAL
Qty: 14 | Refills: 0
Start: 2024-10-23 | End: 2024-10-29

## 2024-10-23 RX ORDER — ACETAMINOPHEN 500 MG
2 TABLET ORAL
Qty: 40 | Refills: 0
Start: 2024-10-23 | End: 2024-10-27

## 2024-10-23 RX ORDER — INFLUENZ VIR VAC TV P-SURF2003 15MCG/.5ML
0.5 SYRINGE (ML) INTRAMUSCULAR ONCE
Refills: 0 | Status: COMPLETED | OUTPATIENT
Start: 2024-10-23 | End: 2024-10-23

## 2024-10-23 RX ORDER — IBUPROFEN 200 MG
1 TABLET ORAL
Qty: 20 | Refills: 0
Start: 2024-10-23 | End: 2024-10-27

## 2024-10-23 RX ADMIN — Medication 600 MILLIGRAM(S): at 00:51

## 2024-10-23 RX ADMIN — Medication 975 MILLIGRAM(S): at 04:37

## 2024-10-23 RX ADMIN — Medication 600 MILLIGRAM(S): at 06:19

## 2024-10-23 RX ADMIN — Medication 600 MILLIGRAM(S): at 07:19

## 2024-10-23 RX ADMIN — FAMOTIDINE 20 MILLIGRAM(S): 10 INJECTION INTRAVENOUS at 06:19

## 2024-10-23 RX ADMIN — Medication 0.5 MILLILITER(S): at 06:16

## 2024-10-23 RX ADMIN — Medication 975 MILLIGRAM(S): at 05:37

## 2024-10-23 RX ADMIN — SIMETHICONE 80 MILLIGRAM(S): 80 TABLET, CHEWABLE ORAL at 06:19

## 2024-10-23 NOTE — PROGRESS NOTE ADULT - SUBJECTIVE AND OBJECTIVE BOX
Patient seen at bedside resting comfortably offers no new complaints. + Ambulation, + void without difficulty, + flatus;  + bm;  tolerating regular diet. both breastfeeding and bottle feeding. Denies HA, blurry vision or epigastric pain, CP, SOB, N/V/D,  dizziness, palpitations, worsening vaginal bleeding.     Vital Signs Last 24 Hrs  T(C): 36.7 (23 Oct 2024 05:52), Max: 36.9 (22 Oct 2024 10:09)  T(F): 98 (23 Oct 2024 05:52), Max: 98.4 (22 Oct 2024 10:09)  HR: 80 (23 Oct 2024 05:52) (80 - 87)  BP: 100/64 (23 Oct 2024 05:52) (100/64 - 114/77)  RR: 18 (23 Oct 2024 05:52) (18 - 18)  SpO2: 98% (23 Oct 2024 05:52) (98% - 99%)    Parameters below as of 23 Oct 2024 05:52  Patient On (Oxygen Delivery Method): room air      Gen: A&O x 3, NAD  Chest: CTA B/L  Cardiac: S1,S2  RRR  Breast: Soft, nontender, nonengorged  Abdomen: soft; Nontender, nondistended, Incision C/D/I steri strips in place   Gyn: Minimal lochia  Extremities: Nontender, DTRS 2+, no worsening edema                          8.9    13.71 )-----------( 166      ( 22 Oct 2024 06:00 )             25.4       
PA NOTE:  POD#1  s/p: prim cs for NRFHT     pt doing well       Patient seen at bedside resting comfortably offers no new complaints. + Ambulation, voiding without difficulty, + flatus; tolerating regular diet. both breastfeeding and bottle feeding. Denies HA, CP, SOB, N/V/D,  no bm; dizziness, palpitations, worsening abdominal pain, worsening vaginal bleeding, or any other concerns.     Vital Signs Last 24 Hrs  T(C): 36.8 (21 Oct 2024 04:00), Max: 37.1 (20 Oct 2024 17:13)  T(F): 98.2 (21 Oct 2024 04:00), Max: 98.8 (20 Oct 2024 17:13)  HR: 78 (21 Oct 2024 04:00) (78 - 108)  BP: 90/54 (21 Oct 2024 04:00) (76/38 - 112/76)  BP(mean): 72 (20 Oct 2024 23:15) (49 - 78)  RR: 16 (21 Oct 2024 04:00) (14 - 22)  SpO2: 99% (21 Oct 2024 04:00) (95% - 99%)    Parameters below as of 21 Oct 2024 04:00  Patient On (Oxygen Delivery Method): room air        Gen: A&O x 3, NAD  Chest: CTABL  Cardiac: S1+S2+ RRR  Breast: Soft, nontender, nonengorged  Abdomen: +BS; soft; Nontender, nondistended,   incision: dressing removed Incision C/D/I steri strips in place   Gyn: Minimal lochia  Extremities: Nontender, DTRS 2+, no worsening edema                          8.7    11.00 )-----------( 138      ( 21 Oct 2024 06:59 )             25.0   
PA NOTE    Patient seen at bedside, resting comfortably with  in the room  Offers no new complaints.  + Ambulation, + void without difficulty, + flatus;  no bm; tolerating regular diet.  Both breastfeeding and bottle feeding.  Denies HA, blurry vision or epigastric pain, CP, SOB, N/V/D,  dizziness, palpitations, worsening vaginal bleeding.    Vital Signs Last 24 Hrs  T(C): 36.6 (22 Oct 2024 05:51), Max: 37.2 (21 Oct 2024 18:15)  T(F): 97.9 (22 Oct 2024 05:51), Max: 99 (21 Oct 2024 18:15)  HR: 79 (22 Oct 2024 05:51) (79 - 102)  BP: 100/61 (22 Oct 2024 05:51) (94/62 - 114/77)  RR: 18 (22 Oct 2024 05:51) (18 - 18)  SpO2: 98% (22 Oct 2024 05:51) (97% - 98%)    Parameters below as of 22 Oct 2024 05:51  Patient On (Oxygen Delivery Method): room air      Gen: A&O x 3, NAD  Chest: CTABL  Cardiac: S1, S2, RRR  Breast: Soft, nontender, nonengorged  Abdomen: +BS; soft; Nontender, nondistended, Incision C/D/I steri strips in place   Gyn: Minimal lochia  Extremities: Nontender, DTRS 2+, no worsening edema                          8.9    13.71 )-----------( 166      ( 22 Oct 2024 06:00 )             25.4

## 2024-10-23 NOTE — PROGRESS NOTE ADULT - PROBLEM SELECTOR PLAN 2
take iron, folic acid, vitamin C, and prenatal vitamins. eat iron fortified food
Take iron and prenatal vitamins. Eat iron fortified foods.

## 2024-10-23 NOTE — PROGRESS NOTE ADULT - PROBLEM SELECTOR PLAN 1
-Pain management as needed  -cont post op care  -OOB and ambulate  -encourage incentive spirometer use  -Encourage breastfeeding  -d/w dr Aguiar
Continue breastfeeding.  Motrin as needed for pain.  Ambulate daily.  No heavy lifting or anything per vagina x 6 weeks - no sex, tampons, douching, tub baths, etc.  Follow up in office in 1-2 weeks for incision check, and then at 6 weeks for postpartum check.

## 2024-10-23 NOTE — PROGRESS NOTE ADULT - ASSESSMENT
PA NOTE:  POD#1  s/p: prim cs for NRFHT     pt doing well       -Pain management as ordered  -gi ppx  -dvt ppx  -diet: regular   -OOB and ambulate  -f/u Rpt CBC in am  -encourage incentive spirometer use  -Encourage breastfeeding   -d/w dr yoon   
A/P: POD #3 s/p primary c/s @ 39 6/7 weeks for NRFHT remote from delivery and FGR; ABLA  -d/c home today  -instructions verbalized  -follow up in 1-2weeks in office for incision check  -d/w dr Rollins
A/P: 23yo  pt POD #2 s/p primary c/s @ 39w6d for NRFHT remote from delivery, JOHN
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
